# Patient Record
(demographics unavailable — no encounter records)

---

## 2025-04-03 NOTE — HISTORY OF PRESENT ILLNESS
[Family Member] : family member [FreeTextEntry1] : This visit was provided via telehealth using real-time 2-way audio visual technology. The patient, ANGELIA GODINEZ was located at home, [address] [place] [address], at the time of the visit.   The provider, Fam LOPEZ, was located at [facility] at the time of the visit.  The patient, ANGELIA GODINEZ and provider participated in the telehealth encounter.  Verbal consent for telehealth services was given at time prior to the start of visit.  [de-identified] : HFI. This is ANGELIA GODINEZ 74 year English, F, presented for virtual visit as stated above.  Recently reported vitals in flow sheet. Medication reconciliation performed.  The patient reported h/o: HTN, O.P. on prolia q 6 months, HLD, depression, and overweight.  BMI 26.63, PCP: Magaly Sepulveda MD ANGELIA GODINEZ, is seen via telecommunication as stated above, appears pleasant and in nad.  Daughter is present. Has active home care services, w/ [facility] scheduled: 14 hrs a week.  AAO (see PE). Patient denies any feeling of depression, and HI/SI. Patient denies any f/c, sob, cp, dizziness, lightheadedness, abnormal bruising/bleeding, n/v/d, or abdominal pain

## 2025-04-03 NOTE — HEALTH RISK ASSESSMENT
[Fair] :  ~his/her~ mood as fair [No] : No [No falls in past year] : Patient reported no falls in the past year [Assistive Device] : Patient uses an assistive device [Little interest or pleasure doing things] : 1) Little interest or pleasure doing things [0] : 1) Little interest or pleasure doing things: Not at all (0) [Feeling down, depressed, or hopeless] : 2) Feeling down, depressed, or hopeless [3] : 2) Feeling down, depressed, or hopeless for nearly every day (3) [PHQ-2 Positive] : PHQ-2 Positive [Nearly Every Day (3)] : 6.) Feeling bad about yourself, or that you are a failure, or have let yourself or your family down? Nearly every day [Not at All (0)] : 9.) Thoughts that you would be off dead or of hurting yourself in some way? Not at all [Moderately Severe] : Severity of Depression is Moderately Severe [PHQ-9 Positive] : PHQ-9 Positive [I have developed a follow-up plan documented below in the note.] : I have developed a follow-up plan documented below in the note. [Never] : Never [Patient reported mammogram was normal] : Patient reported mammogram was normal [Patient reported bone density results were abnormal] : Patient reported bone density results were abnormal [Financial] : financial [Access to Medications] : access to medications [Transportation] : transportation [Food] : food [Employment] : employment [Housing] : housing [Behavioral] : behavioral [Health Literacy] : health literacy [Utilities] : utilities [None] : None [ SDOH Screening] : Social service referral provided to patient and briefly addressed in the office. [Time Spent: ___ Minutes] : I spent [unfilled] minutes performing an SDOH assessment. [With Family] : lives with family [Retired] : retired [Feels Safe at Home] : Feels safe at home [Reports normal functional visual acuity (ie: able to read med bottle)] : Reports normal functional visual acuity [Smoke Detector] : smoke detector [Carbon Monoxide Detector] : carbon monoxide detector [With Patient/Caregiver] : , with patient/caregiver [Designated Healthcare Proxy] : Designated healthcare proxy [Name: ___] : Health Care Proxy's Name: [unfilled]  [Relationship: ___] : Relationship: [unfilled] [I will adhere to the patient's wishes.] : I will adhere to the patient's wishes. [Time Spent: ___ minutes] : Time Spent: [unfilled] minutes [Audit-CScore] : 0 [de-identified] : cane [[id number]] : 3 [[id number]] : 15 [Change in mental status noted] : No change in mental status noted [Language] : denies difficulty with language [Behavior] : denies difficulty with behavior [Learning/Retaining New Information] : denies difficulty learning/retaining new information [Handling Complex Tasks] : denies difficulty handling complex tasks [Reasoning] : denies difficulty with reasoning [Spatial Ability and Orientation] : denies difficulty with spatial ability and orientation [Sexually Active] : not sexually active [High Risk Behavior] : no high risk behavior [Reports changes in hearing] : Reports no changes in hearing [Reports changes in vision] : Reports no changes in vision [Reports changes in dental health] : Reports no changes in dental health [MammogramDate] : 07/24 [BoneDensityDate] : 07/24 [BoneDensityComments] : O.P [de-identified] : w/ HHA assist [de-identified] : w/ HHA assist [de-identified] : w/ glasses [AdvancecareDate] : 04/25 [FreeTextEntry4] : hcp completed, content not disclosed.

## 2025-04-03 NOTE — PHYSICAL EXAM
[de-identified] : Telehealth precludes traditional, comprehensive physical exam. Patient appeared stable and alert.  virtual video visit w/ objective observation and subject reporting only.

## 2025-04-03 NOTE — COUNSELING
[Fall prevention counseling provided] : Fall prevention counseling provided [Adequate lighting] : Adequate lighting [No throw rugs] : No throw rugs [Use proper foot wear] : Use proper foot wear [Use recommended devices] : Use recommended devices [Behavioral health counseling provided] : Behavioral health counseling provided [Sleep ___ hours/day] : Sleep [unfilled] hours/day [Engage in a relaxing activity] : Engage in a relaxing activity [Plan in advance] : Plan in advance [No] : Risk of tobacco use and health benefits of smoking cessation discussed: No [Potential consequences of obesity discussed] : Potential consequences of obesity discussed [Benefits of weight loss discussed] : Benefits of weight loss discussed [Encouraged to maintain food diary] : Encouraged to maintain food diary [Encouraged to increase physical activity] : Encouraged to increase physical activity [Encouraged to use exercise tracking device] : Encouraged to use exercise tracking device [Weigh Self Weekly] : weigh self weekly [Decrease Portions] : decrease portions [Keep Food Diary] : keep food diary [FreeTextEntry1] : w/ assistive device

## 2025-04-03 NOTE — REVIEW OF SYSTEMS
[Vision Problems] : vision problems [Unsteady Walking] : ataxia [Depression] : depression [Negative] : Integumentary [FreeTextEntry3] : w/ glasses

## 2025-04-03 NOTE — PLAN
[FreeTextEntry1] : Patient seen in home, enforced w/ pt the need for daily weight/bp monitoring, low salt diet/fat and educated pt to avoid processed/canned food and limit take out, increase vegetable/fiber and fruit intake (portion control).  Daily exercise w/ easy to reach realistic goals.  Continue w/ current regimen and medication as ordered.  Referral to psychiatry, pt does not like the referral and may declined.  Risks and benefits explained.  Adhere to follow up w/ pcp.  Pt advised to call with any questions/concerns  Total time spent w/ patient 30mins. Depression screening time spent >15 mins.